# Patient Record
Sex: FEMALE | Race: WHITE
[De-identification: names, ages, dates, MRNs, and addresses within clinical notes are randomized per-mention and may not be internally consistent; named-entity substitution may affect disease eponyms.]

---

## 2019-01-10 ENCOUNTER — HOSPITAL ENCOUNTER (EMERGENCY)
Dept: HOSPITAL 10 - FTE | Age: 64
Discharge: HOME | End: 2019-01-10
Payer: COMMERCIAL

## 2019-01-10 ENCOUNTER — HOSPITAL ENCOUNTER (EMERGENCY)
Dept: HOSPITAL 91 - FTE | Age: 64
Discharge: HOME | End: 2019-01-10
Payer: COMMERCIAL

## 2019-01-10 VITALS — DIASTOLIC BLOOD PRESSURE: 85 MMHG | RESPIRATION RATE: 20 BRPM | HEART RATE: 58 BPM | SYSTOLIC BLOOD PRESSURE: 200 MMHG

## 2019-01-10 VITALS
WEIGHT: 112.44 LBS | WEIGHT: 112.44 LBS | BODY MASS INDEX: 22.67 KG/M2 | HEIGHT: 59 IN | HEIGHT: 59 IN | BODY MASS INDEX: 22.67 KG/M2

## 2019-01-10 DIAGNOSIS — Z87.891: ICD-10-CM

## 2019-01-10 DIAGNOSIS — M79.644: Primary | ICD-10-CM

## 2019-01-10 PROCEDURE — 99283 EMERGENCY DEPT VISIT LOW MDM: CPT

## 2019-01-10 PROCEDURE — 73140 X-RAY EXAM OF FINGER(S): CPT

## 2019-01-10 NOTE — ERD
ER Documentation


Chief Complaint


Chief Complaint





right thumb pain/swelling x 3 days





HPI


63-year-old female, presents the emergency department, complaining of 3 days 


with right thumb distal pain and swelling.  The pain is dull, 3 out of 10.  No 


history of trauma, no history of insect bite.  She denies fevers, no chills, no 


local warmth, rashes or fluctuance.  The patient has a past medical history of 


hypertension and she did not take her medications today, she denies headache, 


blurred vision, no chest pain, no dizziness.





ROS


All systems reviewed and are negative except as per history of present illness.





Medications


Home Meds


Active Scripts


Ibuprofen* (Motrin*) 400 Mg Tab, 400 MG PO Q8, #12 TAB


   Prov:FELY MARTINEZ MD         1/10/19


Cephalexin* (Keflex*) 500 Mg Capsule, 500 MG PO BID for 7 Days, CAP


   Prov:FELY MARTINEZ MD         1/10/19


Reported Medications


[None]   No Conflict Check


   16





Allergies


Allergies:  


Coded Allergies:  


     No Known Allergy (Unverified , 16)





PMhx/Soc


History of Surgery:  Yes ()


Anesthesia Reaction:  No


Hx Neurological Disorder:  No


Hx Respiratory Disorders:  No


Hx Cardiac Disorders:  No


Hx Psychiatric Problems:  No


Hx Miscellaneous Medical Probl:  No


Hx Alcohol Use:  No


Hx Substance Use:  No


Hx Tobacco Use:  Yes





Physical Exam


Vitals





Vital Signs


  Date      Temp  Pulse  Resp  B/P (MAP)   Pulse Ox  O2          O2 Flow    FiO2


Time                                                 Delivery    Rate


   1/10/19  98.2     58    20      200/85        98  Room Air


     13:04                          (123)


   1/10/19  96.2     60    19      176/77        99


     10:13                          (110)





Physical Exam


Const:   No acute distress


Head:   Atraumatic 


Eyes:    Normal Conjunctiva


ENT:    Normal External Ears, Nose and Mouth.


Neck:               Full range of motion. No meningismus.


Resp:   Clear to auscultation bilaterally


Cardio:   Regular rate and rhythm, no murmurs


Abd:    Soft, non tender, non distended. Normal bowel sounds


Skin:   No petechiae or rashes


Back:   No midline or flank tenderness


Ext:    Right thumb with edema of the distal phalanx, but no erythema, no 


warmth, no fluctuance.


Neur:   Awake and alert


Psych:    Normal Mood and Affect





Procedures/MDM


Differential diagnosis considered include but not limited are: phalen, 


cellulitis, paronychia, sprain/strain, ligament injury, fracture, dislocation, 


low suspicion for acute infectious process.  Soft compartments, neurovascular 


exam grossly intact. 





Physical examination and clinical presentation consistent with superficial skin 


infection.  Also the patient has history of hypertension, she did not take 


medications today.  


During the ED course the patient remained hemodynamically stable and 


asymptomatic.


Results and clinical impression discussed with patient who agrees with 


management. The patient is stable to be treated outpatient and will be 


discharged home with recommendations for antibiotics, antibiotics, ice, rest, 


NSAIDs 3 times daily for 5 days and close monitoring.





The patient was instructed to follow up with the primary care provider in the 


next 48h.  If symptoms persist, worsen or new symptoms develop, then patient 


should return to the ED immediately.





Instructions explained and given to patient with acknowledgment and demonstrated


 understanding.





Disclaimer: Inadvertent spelling and grammatical errors are likely due to 


EHR/dictation software use and do not reflect on the overall quality of patient 


care. Also, please note that the electronic time recorded on this note does not 


necessarily reflect the actual time of the patient encounter.





Departure


Diagnosis:  


   Primary Impression:  


   Pain of right thumb


Condition:  Stable





Additional Instructions:  


Thank you very much for allowing us to participate in your care. 


Your health and safety is our top priority at [facility].





Call your primary care doctor TOMORROW for an appointment during the next 2-4 


days and bring all the information and medications prescribed. 





Have prescriptions filled and follow precisely the directions on the label. 





If the symptoms get worse and your provider is unavailable, return to the 


Emergency Department immediately.











FELY MARTINEZ MD      Mahad 10, 2019 11:57